# Patient Record
Sex: MALE | Race: OTHER | NOT HISPANIC OR LATINO | URBAN - METROPOLITAN AREA
[De-identification: names, ages, dates, MRNs, and addresses within clinical notes are randomized per-mention and may not be internally consistent; named-entity substitution may affect disease eponyms.]

---

## 2017-09-04 ENCOUNTER — EMERGENCY (EMERGENCY)
Facility: HOSPITAL | Age: 32
LOS: 1 days | Discharge: HOME | End: 2017-09-04
Attending: EMERGENCY MEDICINE | Admitting: EMERGENCY MEDICINE
Payer: SELF-PAY

## 2017-09-04 VITALS
DIASTOLIC BLOOD PRESSURE: 71 MMHG | OXYGEN SATURATION: 97 % | HEART RATE: 117 BPM | TEMPERATURE: 99 F | RESPIRATION RATE: 24 BRPM | SYSTOLIC BLOOD PRESSURE: 135 MMHG

## 2017-09-04 VITALS
TEMPERATURE: 98 F | OXYGEN SATURATION: 96 % | RESPIRATION RATE: 20 BRPM | SYSTOLIC BLOOD PRESSURE: 139 MMHG | DIASTOLIC BLOOD PRESSURE: 81 MMHG | HEART RATE: 100 BPM

## 2017-09-04 PROCEDURE — 72125 CT NECK SPINE W/O DYE: CPT | Mod: 26

## 2017-09-04 PROCEDURE — 99285 EMERGENCY DEPT VISIT HI MDM: CPT

## 2017-09-04 PROCEDURE — 70450 CT HEAD/BRAIN W/O DYE: CPT

## 2017-09-04 PROCEDURE — 72125 CT NECK SPINE W/O DYE: CPT

## 2017-09-04 PROCEDURE — 70450 CT HEAD/BRAIN W/O DYE: CPT | Mod: 26

## 2017-09-04 RX ORDER — SODIUM CHLORIDE 9 MG/ML
1000 INJECTION INTRAMUSCULAR; INTRAVENOUS; SUBCUTANEOUS ONCE
Qty: 0 | Refills: 0 | Status: COMPLETED | OUTPATIENT
Start: 2017-09-04 | End: 2017-09-04

## 2017-09-04 RX ADMIN — SODIUM CHLORIDE 1000 MILLILITER(S): 9 INJECTION INTRAMUSCULAR; INTRAVENOUS; SUBCUTANEOUS at 21:08

## 2017-09-04 NOTE — ED PROVIDER NOTE - CONSTITUTIONAL, MLM
normal... Well appearing, in c-collar, well nourished, awake, alert, oriented to person, place, time/situation and in no apparent distress.

## 2017-09-04 NOTE — ED PROVIDER NOTE - MEDICAL DECISION MAKING DETAILS
32 m chronic etoh, brought in for etoh intox and fall.  VSS. No evidence of trauma on exam. Will check ct head, c spine, FS and IVF

## 2017-09-04 NOTE — ED PROVIDER NOTE - ATTENDING CONTRIBUTION TO CARE
Patient without medical complaint bibems secondary to intoxication, drowsy, following some commands  ncat, bilateral nystagmus, cooperative, following some commands, trachea midline, bilateral breath sounds, non-tachycardic, non-tachypneic, ctab, soft, ntnd, no deformity of extremities   will get fluids, ct head/neck and reassess  ct within normal limits   The patient was re-examined after interventions and is feeling much better.  The patient will follow up with their primary physician this week.   No immediate life threatening issues present on history or clinical exam. Patient is a safe disposition home, has capacity and insight into their condition, ambulatory in the emergency department and will follow up with their doctor(s) this week. Patient  understands anticipatory guidance and was given strict return and follow up precautions. The patient has been informed of all concerning signs and symptoms to return to Emergency Department, the necessity to follow up with PMD/Clinic/follow up provided within 2-3 days was explained, and the patient reports understanding of above with capacity and insight.

## 2017-09-04 NOTE — ED PROVIDER NOTE - MUSCULOSKELETAL, MLM
Spine appears normal, range of motion is not limited, no muscle or joint tenderness, moving all extremities

## 2017-09-04 NOTE — ED PROVIDER NOTE - PROGRESS NOTE DETAILS
Morad: ct head and c-spine negative. patient ambulating well without assitance and AOx3. Will d.c home. Morad: ct head and c-spine negative. patient ambulating well without assistance and AOx3. Will d.c home. Patient with capacity and insight, in control of faculties, speaking in full sentences without slurring of words, no nystagmus, no ataxia, and appropriate without further medical complaint.

## 2017-09-04 NOTE — ED ADULT NURSE NOTE - DISCHARGE TEACHING
Discharge instructions provided, patient verbalized understanding, left ED in stable condition. Patient able to walk with steady gait.

## 2017-09-04 NOTE — ED PROVIDER NOTE - OBJECTIVE STATEMENT
32 M h/o etoh abuse, per patient has had seizures in past, no h/o intubation, brought in by EMS for etoh intox and fall. patient says he drank 1L of vodka today, does nto recall falling. denying any symptoms currently. Moving all extremities. 32 M h/o etoh abuse, per patient has had seizures in past, no h/o intubation, brought in by EMS for etoh intox and fall. patient says he drank 1L of vodka today, does nto recall falling. denying any symptoms currently. Moving all extremities. no medical complaints.

## 2017-09-04 NOTE — ED ADULT NURSE NOTE - OBJECTIVE STATEMENT
Received patient awake and confused, disoriented to time and place and unable to state exactly what brought him to the hospital. Patient BIBA when found lying in a supine position on the sidewalk. +ETOH, alcohol on breath, patient seems very anxious. Per patient, drinks about 15 liters of Vodka per day. C/O back pain, C-collar placed in the ED, IV access maintained, safety precaution maintained, will continue to monitor.